# Patient Record
Sex: MALE | Race: WHITE | NOT HISPANIC OR LATINO | Employment: OTHER | URBAN - METROPOLITAN AREA
[De-identification: names, ages, dates, MRNs, and addresses within clinical notes are randomized per-mention and may not be internally consistent; named-entity substitution may affect disease eponyms.]

---

## 2023-09-13 ENCOUNTER — OFFICE VISIT (OUTPATIENT)
Dept: FAMILY MEDICINE CLINIC | Facility: CLINIC | Age: 68
End: 2023-09-13
Payer: MEDICARE

## 2023-09-13 VITALS
WEIGHT: 178 LBS | HEIGHT: 71 IN | HEART RATE: 81 BPM | SYSTOLIC BLOOD PRESSURE: 120 MMHG | DIASTOLIC BLOOD PRESSURE: 68 MMHG | BODY MASS INDEX: 24.92 KG/M2 | OXYGEN SATURATION: 98 %

## 2023-09-13 DIAGNOSIS — Z12.11 SCREENING FOR COLON CANCER: ICD-10-CM

## 2023-09-13 DIAGNOSIS — I10 PRIMARY HYPERTENSION: ICD-10-CM

## 2023-09-13 DIAGNOSIS — Z12.5 PROSTATE CANCER SCREENING: ICD-10-CM

## 2023-09-13 DIAGNOSIS — Z00.00 ROUTINE HEALTH MAINTENANCE: Primary | ICD-10-CM

## 2023-09-13 DIAGNOSIS — R68.82 LOW LIBIDO: ICD-10-CM

## 2023-09-13 DIAGNOSIS — Z13.1 SCREENING FOR DIABETES MELLITUS: ICD-10-CM

## 2023-09-13 DIAGNOSIS — Z13.6 ENCOUNTER FOR LIPID SCREENING FOR CARDIOVASCULAR DISEASE: ICD-10-CM

## 2023-09-13 DIAGNOSIS — Z13.220 ENCOUNTER FOR LIPID SCREENING FOR CARDIOVASCULAR DISEASE: ICD-10-CM

## 2023-09-13 PROBLEM — I87.2 VENOUS INSUFFICIENCY: Status: ACTIVE | Noted: 2019-01-14

## 2023-09-13 PROBLEM — G47.61 PERIODIC LIMB MOVEMENT DISORDER: Status: ACTIVE | Noted: 2018-09-13

## 2023-09-13 RX ORDER — TADALAFIL 5 MG/1
5 TABLET ORAL DAILY PRN
Qty: 90 TABLET | Refills: 2 | Status: SHIPPED | OUTPATIENT
Start: 2023-09-13

## 2023-09-13 RX ORDER — CHLORTHALIDONE 25 MG/1
25 TABLET ORAL DAILY
Qty: 90 TABLET | Refills: 0 | Status: SHIPPED | OUTPATIENT
Start: 2023-09-13

## 2023-09-13 RX ORDER — TADALAFIL 5 MG/1
5 TABLET ORAL DAILY PRN
COMMUNITY
End: 2023-09-13 | Stop reason: SDUPTHER

## 2023-09-13 RX ORDER — EMTRICITABINE AND TENOFOVIR ALAFENAMIDE 200; 25 MG/1; MG/1
TABLET ORAL DAILY
COMMUNITY
End: 2023-09-13

## 2023-09-13 RX ORDER — CHLORTHALIDONE 25 MG/1
25 TABLET ORAL
COMMUNITY
Start: 2023-07-24 | End: 2023-09-13 | Stop reason: SDUPTHER

## 2023-09-13 NOTE — PATIENT INSTRUCTIONS
Advance Directive    Advance directives are legal documents that allow you to make decisions about your health care and medical treatment in case you become unable to communicate for yourself. Advance directives let your wishes be known to family, friends, and health care providers.  Discussing and writing advance directives should happen over time rather than all at once. Advance directives can be changed and updated at any time. There are different types of advance directives, such as:  Medical power of .  Living will.  Do not resuscitate (DNR) order or do not attempt resuscitation (DNAR) order.  Health care proxy and medical power of   A health care proxy is also called a health care agent. This person is appointed to make medical decisions for you when you are unable to make decisions for yourself. Generally, people ask a trusted friend or family member to act as their proxy and represent their preferences. Make sure you have an agreement with your trusted person to act as your proxy. A proxy may have to make a medical decision on your behalf if your wishes are not known.  A medical power of , also called a durable power of  for health care, is a legal document that names your health care proxy. Depending on the laws in your state, the document may need to be:  Signed.  Notarized.  Dated.  Copied.  Witnessed.  Incorporated into your medical record.  You may also want to appoint a trusted person to manage your money in the event you are unable to do so. This is called a durable power of  for finances. It is a separate legal document from the durable power of  for health care. You may choose your health care proxy or someone different to act as your agent in money matters.  If you do not appoint a proxy, or there is a concern that the proxy is not acting in your best interest, a court may appoint a guardian to act on your behalf.  Living will  A living will is a set  of instructions that state your wishes about medical care when you cannot express them yourself. Health care providers should keep a copy of your living will in your medical record. You may want to give a copy to family members or friends. To alert caregivers in case of an emergency, you can place a card in your wallet to let them know that you have a living will and where they can find it. A living will is used if you become:  Terminally ill.  Disabled.  Unable to communicate or make decisions.  The following decisions should be included in your living will:  To use or not to use life support equipment, such as dialysis machines and breathing machines (ventilators).  Whether you want a DNR or DNAR order. This tells health care providers not to use cardiopulmonary resuscitation (CPR) if breathing or heartbeat stops.  To use or not to use tube feeding.  To be given or not to be given food and fluids.  Whether you want comfort (palliative) care when the goal becomes comfort rather than a cure.  Whether you want to donate your organs and tissues.  A living will does not give instructions for distributing your money and property if you should pass away.  DNR or DNAR  A DNR or DNAR order is a request not to have CPR in the event that your heart stops beating or you stop breathing. If a DNR or DNAR order has not been made and shared, a health care provider will try to help any patient whose heart has stopped or who has stopped breathing. If you plan to have surgery, talk with your health care provider about how your DNR or DNAR order will be followed if problems occur.  What if I do not have an advance directive?  Some states assign family decision makers to act on your behalf if you do not have an advance directive. Each state has its own laws about advance directives. You may want to check with your health care provider, , or state representative about the laws in your state.  Summary  Advance directives are  legal documents that allow you to make decisions about your health care and medical treatment in case you become unable to communicate for yourself.  The process of discussing and writing advance directives should happen over time. You can change and update advance directives at any time.  Advance directives may include a medical power of , a living will, and a DNR or DNAR order.  This information is not intended to replace advice given to you by your health care provider. Make sure you discuss any questions you have with your health care provider.  Document Revised: 09/21/2021 Document Reviewed: 09/21/2021  Elsevier Patient Education © 2023 [employer]

## 2023-09-13 NOTE — PROGRESS NOTES
The ABCs of the Annual Wellness Visit  Subsequent Medicare Wellness Visit    Subjective      Otoniel Soni is a 68 y.o. male who presents for a Subsequent Medicare Wellness Visit.    Here today to establish care and for preventive health maintenance. Doing well overall. Would like to get some routine blood work done. Has a family history of prostate cancer in his father. Would like to get a PSA checked. Has noticed some slightly decreased libido over the years. Had a testosterone level that was checked that was within normal limits however his free testosterone was very slightly low.    Told that he had high blood pressure in the past and was started on some chlorthalidone however pressures have been somewhat soft. Wondering if this is something that he needs to continue. Also takes tadalafil for help with erectile function. Needs refills of both.    Due for colonoscopy. Last one was about 3 years ago and had some polyps found.    The following portions of the patient's history were reviewed and   updated as appropriate: allergies, current medications, past family history, past medical history, past social history, past surgical history, and problem list.    Compared to one year ago, the patient feels his physical   health is the same.    Compared to one year ago, the patient feels his mental   health is the same.    Recent Hospitalizations:  He was not admitted to the hospital during the last year.       Current Medical Providers:  Patient Care Team:  Ulysses Damian MD as PCP - General (Family Medicine)    Outpatient Medications Prior to Visit   Medication Sig Dispense Refill    chlorthalidone (HYGROTON) 25 MG tablet 1 tablet.      Emtricitabine-Tenofovir AF (Descovy) 200-25 MG per tablet Take  by mouth Daily.      tadalafil (CIALIS) 5 MG tablet Take 1 tablet by mouth Daily As Needed for Erectile Dysfunction.       No facility-administered medications prior to visit.       No opioid medication identified  "on active medication list. I have reviewed chart for other potential  high risk medication/s and harmful drug interactions in the elderly.        Aspirin is not on active medication list.  Aspirin use is not indicated based on review of current medical condition/s. Risk of harm outweighs potential benefits.  .    Patient Active Problem List   Diagnosis    Periodic limb movement disorder    Hypertension    Venous insufficiency     Advance Care Planning   Advance Care Planning     Advance Directive is not on file.  ACP discussion was held with the patient during this visit. Patient does not have an advance directive, information provided.     Objective    Vitals:    23 1456   BP: 120/68   Pulse: 81   SpO2: 98%   Weight: 80.7 kg (178 lb)   Height: 180.3 cm (71\")     Estimated body mass index is 24.83 kg/m² as calculated from the following:    Height as of this encounter: 180.3 cm (71\").    Weight as of this encounter: 80.7 kg (178 lb).    BMI is within normal parameters. No other follow-up for BMI required.      Does the patient have evidence of cognitive impairment?   No    Lab Results   Component Value Date    CHLPL 194 2023    TRIG 110 2023    HDL 80 2023    LDL 95 2023    VLDL 19 2023          HEALTH RISK ASSESSMENT    Smoking Status:  Social History     Tobacco Use   Smoking Status Never    Passive exposure: Never   Smokeless Tobacco Never     Alcohol Consumption:  Social History     Substance and Sexual Activity   Alcohol Use Yes    Alcohol/week: 6.0 standard drinks    Types: 2 Glasses of wine, 2 Cans of beer, 2 Shots of liquor per week     Fall Risk Screen:    MARTHA Fall Risk Assessment was completed, and patient is at LOW risk for falls.Assessment completed on:2023    Depression Screenin/13/2023     2:58 PM   PHQ-2/PHQ-9 Depression Screening   Little Interest or Pleasure in Doing Things 0-->not at all   Feeling Down, Depressed or Hopeless 0-->not at all "   PHQ-9: Brief Depression Severity Measure Score 0       Health Habits and Functional and Cognitive Screenin/14/2023     8:10 AM   Functional & Cognitive Status   Do you have difficulty preparing food and eating? No   Do you have difficulty bathing yourself, getting dressed or grooming yourself? No   Do you have difficulty using the toilet? No   Do you have difficulty moving around from place to place? No   Do you have trouble with steps or getting out of a bed or a chair? No   Current Diet Well Balanced Diet   Dental Exam Up to date   Eye Exam Up to date   Exercise (times per week) 3 times per week   Current Exercises Include Walking   Do you need help using the phone?  No   Are you deaf or do you have serious difficulty hearing?  No   Do you need help to go to places out of walking distance? No   Do you need help shopping? No   Do you need help preparing meals?  No   Do you need help with housework?  No   Do you need help with laundry? No   Do you need help taking your medications? No   Do you need help managing money? No   Do you ever drive or ride in a car without wearing a seat belt? No   Have you felt unusual stress, anger or loneliness in the last month? No   Who do you live with? Spouse   If you need help, do you have trouble finding someone available to you? No   Do you have difficulty concentrating, remembering or making decisions? No       Age-appropriate Screening Schedule:  Refer to the list below for future screening recommendations based on patient's age, sex and/or medical conditions. Orders for these recommended tests are listed in the plan section. The patient has been provided with a written plan.    Health Maintenance   Topic Date Due    COLORECTAL CANCER SCREENING  Never done    TDAP/TD VACCINES (1 - Tdap) Never done    ZOSTER VACCINE (1 of 2) Never done    Hepatitis B (2 of 3 - Hep B Twinrix risk 3-dose series) 2017    COVID-19 Vaccine (4 - Moderna series) 11/10/2021    INFLUENZA  VACCINE  10/01/2023    ANNUAL WELLNESS VISIT  09/13/2024    HEPATITIS C SCREENING  Completed    Pneumococcal Vaccine 65+  Completed                  CMS Preventative Services Quick Reference  Risk Factors Identified During Encounter:    Immunizations Discussed/Encouraged: Tdap, Influenza, Shingrix, and COVID19    The above risks/problems have been discussed with the patient.  Pertinent information has been shared with the patient in the After Visit Summary.    Diagnoses and all orders for this visit:    1. Routine health maintenance (Primary)    2. Low libido  -     tadalafil (CIALIS) 5 MG tablet; Take 1 tablet by mouth Daily As Needed for Erectile Dysfunction.  Dispense: 90 tablet; Refill: 2    3. Primary hypertension  -     chlorthalidone (HYGROTON) 25 MG tablet; Take 1 tablet by mouth Daily.  Dispense: 90 tablet; Refill: 0    4. Screening for diabetes mellitus  -     Comprehensive Metabolic Panel    5. Encounter for lipid screening for cardiovascular disease  -     Lipid Panel    6. Prostate cancer screening  -     PSA Screen    7. Screening for colon cancer  -     Ambulatory Referral For Screening Colonoscopy    Orders as above. I will contact him with lab results as available. Continue regimen as prescribed. Referral for colonoscopy as discussed.    Encouraged to continue working on healthy lifestyle habits. Recommended follow-up as below. Encouraged communication via [employer] in the meantime.    Follow Up:   Next Medicare Wellness visit to be scheduled in 1 year.      An After Visit Summary and PPPS were made available to the patient.    Prevention counseling performed as below: Mindfulness for stress management.

## 2023-09-14 LAB
ALBUMIN SERPL-MCNC: 4.7 G/DL (ref 3.9–4.9)
ALBUMIN/GLOB SERPL: 1.5 {RATIO} (ref 1.2–2.2)
ALP SERPL-CCNC: 83 IU/L (ref 44–121)
ALT SERPL-CCNC: 26 IU/L (ref 0–44)
AST SERPL-CCNC: 30 IU/L (ref 0–40)
BILIRUB SERPL-MCNC: 0.3 MG/DL (ref 0–1.2)
BUN SERPL-MCNC: 16 MG/DL (ref 8–27)
BUN/CREAT SERPL: 15 (ref 10–24)
CALCIUM SERPL-MCNC: 10 MG/DL (ref 8.6–10.2)
CHLORIDE SERPL-SCNC: 98 MMOL/L (ref 96–106)
CHOLEST SERPL-MCNC: 194 MG/DL (ref 100–199)
CO2 SERPL-SCNC: 27 MMOL/L (ref 20–29)
CREAT SERPL-MCNC: 1.07 MG/DL (ref 0.76–1.27)
EGFRCR SERPLBLD CKD-EPI 2021: 75 ML/MIN/1.73
GLOBULIN SER CALC-MCNC: 3.1 G/DL (ref 1.5–4.5)
GLUCOSE SERPL-MCNC: 101 MG/DL (ref 70–99)
HDLC SERPL-MCNC: 80 MG/DL
LDLC SERPL CALC-MCNC: 95 MG/DL (ref 0–99)
POTASSIUM SERPL-SCNC: 3.7 MMOL/L (ref 3.5–5.2)
PROT SERPL-MCNC: 7.8 G/DL (ref 6–8.5)
PSA SERPL-MCNC: 3.3 NG/ML (ref 0–4)
SODIUM SERPL-SCNC: 140 MMOL/L (ref 134–144)
TRIGL SERPL-MCNC: 110 MG/DL (ref 0–149)
VLDLC SERPL CALC-MCNC: 19 MG/DL (ref 5–40)

## 2023-09-17 DIAGNOSIS — I10 PRIMARY HYPERTENSION: ICD-10-CM

## 2023-09-18 RX ORDER — CHLORTHALIDONE 25 MG/1
25 TABLET ORAL DAILY
Qty: 90 TABLET | Refills: 0 | OUTPATIENT
Start: 2023-09-18

## 2023-09-21 DIAGNOSIS — R68.82 LOW LIBIDO: ICD-10-CM

## 2023-09-21 RX ORDER — TADALAFIL 5 MG/1
5 TABLET ORAL DAILY PRN
Qty: 90 TABLET | Refills: 2 | Status: SHIPPED | OUTPATIENT
Start: 2023-09-21

## 2023-10-06 ENCOUNTER — TELEPHONE (OUTPATIENT)
Dept: GASTROENTEROLOGY | Facility: CLINIC | Age: 68
End: 2023-10-06
Payer: MEDICARE

## 2023-10-06 DIAGNOSIS — Z86.010 PERSONAL HISTORY OF COLONIC POLYPS: Primary | ICD-10-CM

## 2023-10-06 NOTE — TELEPHONE ENCOUNTER
FAST TRACK - REFERRAL  LAST COLONOSCOPY 04/17/2017 BY DR. TAYLOR Zepeda  PERSONAL HISTORY (patient marked)  FAMILY HISTORY POLYPS, BROTHER AGE 60'S?  SCHEDULED AT [facility].

## 2023-10-18 PROBLEM — Z86.0100 PERSONAL HISTORY OF COLONIC POLYPS: Status: ACTIVE | Noted: 2023-10-06

## 2023-10-18 PROBLEM — Z86.010 PERSONAL HISTORY OF COLONIC POLYPS: Status: ACTIVE | Noted: 2023-10-06

## 2024-01-02 DIAGNOSIS — R68.82 LOW LIBIDO: ICD-10-CM

## 2024-01-02 RX ORDER — TADALAFIL 5 MG/1
5 TABLET ORAL DAILY PRN
Qty: 90 TABLET | Refills: 2 | Status: SHIPPED | OUTPATIENT
Start: 2024-01-02 | End: 2024-01-03 | Stop reason: SDUPTHER

## 2024-01-03 DIAGNOSIS — R68.82 LOW LIBIDO: ICD-10-CM

## 2024-01-03 RX ORDER — TADALAFIL 5 MG/1
5 TABLET ORAL DAILY PRN
Qty: 90 TABLET | Refills: 2 | Status: SHIPPED | OUTPATIENT
Start: 2024-01-03

## 2024-02-13 ENCOUNTER — ANESTHESIA (OUTPATIENT)
Dept: SURGERY | Facility: SURGERY CENTER | Age: 69
End: 2024-02-13
Payer: MEDICARE

## 2024-02-13 ENCOUNTER — ANESTHESIA EVENT (OUTPATIENT)
Dept: SURGERY | Facility: SURGERY CENTER | Age: 69
End: 2024-02-13
Payer: MEDICARE

## 2024-02-13 ENCOUNTER — HOSPITAL ENCOUNTER (OUTPATIENT)
Facility: SURGERY CENTER | Age: 69
Setting detail: HOSPITAL OUTPATIENT SURGERY
Discharge: HOME | End: 2024-02-13
Attending: INTERNAL MEDICINE | Admitting: INTERNAL MEDICINE
Payer: MEDICARE

## 2024-02-13 VITALS
HEIGHT: 71 IN | SYSTOLIC BLOOD PRESSURE: 121 MMHG | WEIGHT: 183 LBS | DIASTOLIC BLOOD PRESSURE: 87 MMHG | OXYGEN SATURATION: 98 % | HEART RATE: 74 BPM | TEMPERATURE: 98.2 F | RESPIRATION RATE: 16 BRPM | BODY MASS INDEX: 25.62 KG/M2

## 2024-02-13 DIAGNOSIS — Z86.010 PERSONAL HISTORY OF COLONIC POLYPS: ICD-10-CM

## 2024-02-13 PROCEDURE — 25010000002 LIDOCAINE 1 % SOLUTION: Performed by: STUDENT IN AN ORGANIZED HEALTH CARE EDUCATION/TRAINING PROGRAM

## 2024-02-13 PROCEDURE — 45385 COLONOSCOPY W/LESION REMOVAL: CPT | Performed by: INTERNAL MEDICINE

## 2024-02-13 PROCEDURE — 45380 COLONOSCOPY AND BIOPSY: CPT | Performed by: INTERNAL MEDICINE

## 2024-02-13 PROCEDURE — 25010000002 PROPOFOL 1000 MG/100ML EMULSION: Performed by: STUDENT IN AN ORGANIZED HEALTH CARE EDUCATION/TRAINING PROGRAM

## 2024-02-13 PROCEDURE — 88305 TISSUE EXAM BY PATHOLOGIST: CPT | Performed by: INTERNAL MEDICINE

## 2024-02-13 PROCEDURE — 25810000003 LACTATED RINGERS PER 1000 ML: Performed by: INTERNAL MEDICINE

## 2024-02-13 PROCEDURE — 25010000002 PROPOFOL 10 MG/ML EMULSION: Performed by: STUDENT IN AN ORGANIZED HEALTH CARE EDUCATION/TRAINING PROGRAM

## 2024-02-13 PROCEDURE — 25810000003 LACTATED RINGERS PER 1000 ML: Performed by: STUDENT IN AN ORGANIZED HEALTH CARE EDUCATION/TRAINING PROGRAM

## 2024-02-13 RX ORDER — PROPOFOL 10 MG/ML
INJECTION, EMULSION INTRAVENOUS AS NEEDED
Status: DISCONTINUED | OUTPATIENT
Start: 2024-02-13 | End: 2024-02-13 | Stop reason: SURG

## 2024-02-13 RX ORDER — SODIUM CHLORIDE, SODIUM LACTATE, POTASSIUM CHLORIDE, CALCIUM CHLORIDE 600; 310; 30; 20 MG/100ML; MG/100ML; MG/100ML; MG/100ML
1000 INJECTION, SOLUTION INTRAVENOUS CONTINUOUS
Status: DISCONTINUED | OUTPATIENT
Start: 2024-02-13 | End: 2024-02-13 | Stop reason: HOSPADM

## 2024-02-13 RX ORDER — LIDOCAINE HYDROCHLORIDE 10 MG/ML
0.5 INJECTION, SOLUTION INFILTRATION; PERINEURAL ONCE AS NEEDED
Status: DISCONTINUED | OUTPATIENT
Start: 2024-02-13 | End: 2024-02-13 | Stop reason: HOSPADM

## 2024-02-13 RX ORDER — MAGNESIUM HYDROXIDE 1200 MG/15ML
LIQUID ORAL AS NEEDED
Status: DISCONTINUED | OUTPATIENT
Start: 2024-02-13 | End: 2024-02-13 | Stop reason: HOSPADM

## 2024-02-13 RX ORDER — SODIUM CHLORIDE 0.9 % (FLUSH) 0.9 %
10 SYRINGE (ML) INJECTION AS NEEDED
Status: DISCONTINUED | OUTPATIENT
Start: 2024-02-13 | End: 2024-02-13 | Stop reason: HOSPADM

## 2024-02-13 RX ORDER — LIDOCAINE HYDROCHLORIDE 10 MG/ML
INJECTION, SOLUTION INFILTRATION; PERINEURAL AS NEEDED
Status: DISCONTINUED | OUTPATIENT
Start: 2024-02-13 | End: 2024-02-13 | Stop reason: SURG

## 2024-02-13 RX ORDER — SODIUM CHLORIDE, SODIUM LACTATE, POTASSIUM CHLORIDE, CALCIUM CHLORIDE 600; 310; 30; 20 MG/100ML; MG/100ML; MG/100ML; MG/100ML
INJECTION, SOLUTION INTRAVENOUS CONTINUOUS PRN
Status: DISCONTINUED | OUTPATIENT
Start: 2024-02-13 | End: 2024-02-13 | Stop reason: SURG

## 2024-02-13 RX ADMIN — LIDOCAINE HYDROCHLORIDE 30 MG: 10 INJECTION, SOLUTION INFILTRATION; PERINEURAL at 12:39

## 2024-02-13 RX ADMIN — PROPOFOL 160 MCG/KG/MIN: 10 INJECTION, EMULSION INTRAVENOUS at 12:39

## 2024-02-13 RX ADMIN — SODIUM CHLORIDE, POTASSIUM CHLORIDE, SODIUM LACTATE AND CALCIUM CHLORIDE 1000 ML: 600; 310; 30; 20 INJECTION, SOLUTION INTRAVENOUS at 12:03

## 2024-02-13 RX ADMIN — PROPOFOL 100 MG: 10 INJECTION, EMULSION INTRAVENOUS at 12:39

## 2024-02-13 RX ADMIN — SODIUM CHLORIDE, SODIUM LACTATE, POTASSIUM CHLORIDE, AND CALCIUM CHLORIDE: .6; .31; .03; .02 INJECTION, SOLUTION INTRAVENOUS at 12:39

## 2024-02-14 LAB
LAB AP CASE REPORT: NORMAL
LAB AP CLINICAL INFORMATION: NORMAL
PATH REPORT.FINAL DX SPEC: NORMAL
PATH REPORT.GROSS SPEC: NORMAL

## 2024-05-23 NOTE — PROGRESS NOTES
"Chief Complaint  Establish Care    Subjective    History of Present Illness {CC  Problem List  Visit  Diagnosis   Encounters  Notes  Medications  Labs  Result Review Imaging  Media :23}     Otoniel Soni presents to [facility] for Establish Care.  History of Present Illness         Objective     Vital Signs:   /68   Pulse 81   Ht 180.3 cm (71\")   Wt 80.7 kg (178 lb)   SpO2 98%   BMI 24.83 kg/m²   Physical Exam     Result Review  Data Reviewed:{ Labs  Result Review  Imaging  Med Tab  Media :23}   {The following data was reviewed by (Optional):36854}  {AMBULATORY LABS (Optional):45431}  {Data reviewed (Optional):99336}            Assessment and Plan {CC Problem List  Visit Diagnosis  ROS  Review (Popup)  Health Maintenance  Quality  BestPractice  Medications  SmartSets  SnapShot Encounters  Media :23}   There are no diagnoses linked to this encounter.    BMI is within normal parameters. No other follow-up for BMI required.      Patient was given instructions and counseling regarding his condition or for health maintenance advice. Please see specific information pulled into the AVS (placed there by myself) if appropriate.    No follow-ups on file.      HIMANSHU Damian MD      " n/a

## 2024-08-23 DIAGNOSIS — I10 PRIMARY HYPERTENSION: ICD-10-CM

## 2024-08-23 DIAGNOSIS — R68.82 LOW LIBIDO: ICD-10-CM

## 2024-08-23 RX ORDER — CHLORTHALIDONE 25 MG/1
25 TABLET ORAL DAILY
Qty: 90 TABLET | Refills: 3 | Status: SHIPPED | OUTPATIENT
Start: 2024-08-23

## 2024-08-23 RX ORDER — TADALAFIL 5 MG/1
5 TABLET ORAL DAILY PRN
Qty: 90 TABLET | Refills: 3 | Status: SHIPPED | OUTPATIENT
Start: 2024-08-23

## 2024-12-26 ENCOUNTER — OFFICE VISIT (OUTPATIENT)
Dept: FAMILY MEDICINE CLINIC | Facility: CLINIC | Age: 69
End: 2024-12-26
Payer: MEDICARE

## 2024-12-26 VITALS
HEIGHT: 71 IN | WEIGHT: 183.3 LBS | BODY MASS INDEX: 25.66 KG/M2 | OXYGEN SATURATION: 98 % | SYSTOLIC BLOOD PRESSURE: 160 MMHG | DIASTOLIC BLOOD PRESSURE: 96 MMHG | HEART RATE: 58 BPM | RESPIRATION RATE: 14 BRPM

## 2024-12-26 DIAGNOSIS — Z13.220 ENCOUNTER FOR LIPID SCREENING FOR CARDIOVASCULAR DISEASE: ICD-10-CM

## 2024-12-26 DIAGNOSIS — Z23 NEED FOR VACCINATION: ICD-10-CM

## 2024-12-26 DIAGNOSIS — Z13.6 ENCOUNTER FOR LIPID SCREENING FOR CARDIOVASCULAR DISEASE: ICD-10-CM

## 2024-12-26 DIAGNOSIS — Z12.5 PROSTATE CANCER SCREENING: ICD-10-CM

## 2024-12-26 DIAGNOSIS — I10 PRIMARY HYPERTENSION: ICD-10-CM

## 2024-12-26 DIAGNOSIS — B37.42 CANDIDAL BALANITIS: ICD-10-CM

## 2024-12-26 DIAGNOSIS — Z00.00 ROUTINE HEALTH MAINTENANCE: Primary | ICD-10-CM

## 2024-12-26 DIAGNOSIS — E66.3 OVERWEIGHT WITH BODY MASS INDEX (BMI) OF 25 TO 25.9 IN ADULT: ICD-10-CM

## 2024-12-26 DIAGNOSIS — Z13.1 SCREENING FOR DIABETES MELLITUS: ICD-10-CM

## 2024-12-26 PROCEDURE — 1159F MED LIST DOCD IN RCRD: CPT | Performed by: FAMILY MEDICINE

## 2024-12-26 PROCEDURE — 1160F RVW MEDS BY RX/DR IN RCRD: CPT | Performed by: FAMILY MEDICINE

## 2024-12-26 PROCEDURE — 90480 ADMN SARSCOV2 VAC 1/ONLY CMP: CPT | Performed by: FAMILY MEDICINE

## 2024-12-26 PROCEDURE — 1126F AMNT PAIN NOTED NONE PRSNT: CPT | Performed by: FAMILY MEDICINE

## 2024-12-26 PROCEDURE — G0439 PPPS, SUBSEQ VISIT: HCPCS | Performed by: FAMILY MEDICINE

## 2024-12-26 PROCEDURE — 91320 SARSCV2 VAC 30MCG TRS-SUC IM: CPT | Performed by: FAMILY MEDICINE

## 2024-12-26 PROCEDURE — 99213 OFFICE O/P EST LOW 20 MIN: CPT | Performed by: FAMILY MEDICINE

## 2024-12-26 PROCEDURE — 3080F DIAST BP >= 90 MM HG: CPT | Performed by: FAMILY MEDICINE

## 2024-12-26 PROCEDURE — 3077F SYST BP >= 140 MM HG: CPT | Performed by: FAMILY MEDICINE

## 2024-12-26 RX ORDER — CHLORTHALIDONE 25 MG/1
25 TABLET ORAL DAILY
Qty: 90 TABLET | Refills: 3 | Status: SHIPPED | OUTPATIENT
Start: 2024-12-26

## 2024-12-26 RX ORDER — CLOTRIMAZOLE 1 %
1 CREAM (GRAM) TOPICAL 2 TIMES DAILY
Qty: 28 G | Refills: 1 | Status: SHIPPED | OUTPATIENT
Start: 2024-12-26 | End: 2024-12-28 | Stop reason: SDUPTHER

## 2024-12-26 NOTE — PROGRESS NOTES
Subjective   The ABCs of the Annual Wellness Visit  Medicare Wellness Visit    Otoniel Soni is a 69 y.o. patient who presents for a Medicare Wellness Visit.    Here today as above. Doing fairly well overall. Due for some routine blood work. Would also like to get a COVID-vaccine today. Due for shingles, hep B, and Tdap, will hold off on those for now. Otherwise caught up on preventive health recommendations.    The following portions of the patient's history were reviewed and   updated as appropriate: allergies, current medications, past family history, past medical history, past social history, past surgical history, and problem list.    Compared to one year ago, the patient's physical   health is the same.  Compared to one year ago, the patient's mental   health is the same.    Recent Hospitalizations:  He was not admitted to the hospital during the last year.     Current Medical Providers:  Patient Care Team:  Ulysses Damian MD as PCP - General (Family Medicine)    Outpatient Medications Prior to Visit   Medication Sig Dispense Refill    tadalafil (CIALIS) 5 MG tablet Take 1 tablet by mouth Daily As Needed for Erectile Dysfunction. 90 tablet 3    chlorthalidone (HYGROTON) 25 MG tablet Take 1 tablet by mouth Daily. (Patient taking differently: Take 1 tablet by mouth Daily. PATIENT REPORTS TAKES ONE AT ONSET OF HEADACHE FOR ONE WEEK.) 90 tablet 3     No facility-administered medications prior to visit.     No opioid medication identified on active medication list. I have reviewed chart for other potential  high risk medication/s and harmful drug interactions in the elderly.    Aspirin is not on active medication list.  Aspirin use is not indicated based on review of current medical condition/s. Risk of harm outweighs potential benefits.  .    Patient Active Problem List   Diagnosis    Periodic limb movement disorder    Primary hypertension    Venous insufficiency    Personal history of colonic polyps  "    Advance Care Planning Advance Directive is not on file.  ACP discussion was held with the patient during this visit. Patient has an advance directive (not in EMR), copy requested.      Objective   Vitals:    24 1245   BP: 160/96   Pulse: 58   Resp: 14   SpO2: 98%   Weight: 83.1 kg (183 lb 4.8 oz)   Height: 180.3 cm (71\")   PainSc: 0-No pain     Estimated body mass index is 25.57 kg/m² as calculated from the following:    Height as of this encounter: 180.3 cm (71\").    Weight as of this encounter: 83.1 kg (183 lb 4.8 oz).    BMI is >= 25 and <30. (Overweight) The following options were offered after discussion;: exercise counseling/recommendations and nutrition counseling/recommendations      Does the patient have evidence of cognitive impairment? No                                                                                     Health  Risk Assessment    Smoking Status:  Social History     Tobacco Use   Smoking Status Never    Passive exposure: Never   Smokeless Tobacco Never     Alcohol Consumption:  Social History     Substance and Sexual Activity   Alcohol Use Not Currently    Alcohol/week: 6.0 standard drinks of alcohol    Types: 2 Glasses of wine, 2 Cans of beer, 2 Shots of liquor per week       Fall Risk Screen  STEADI Fall Risk Assessment was completed, and patient is at LOW risk for falls.Assessment completed on:2024    Depression Screening   Little interest or pleasure in doing things? Not at all   Feeling down, depressed, or hopeless? Not at all   PHQ-2 Total Score 0      Health Habits and Functional and Cognitive Screenin/19/2024     1:10 PM   Functional & Cognitive Status   Do you have difficulty preparing food and eating? No    Do you have difficulty bathing yourself, getting dressed or grooming yourself? No    Do you have difficulty using the toilet? No    Do you have difficulty moving around from place to place? No    Do you have trouble with steps or getting out of a bed " or a chair? No    Current Diet Well Balanced Diet    Dental Exam Not up to date    Eye Exam Not up to date    Exercise (times per week) 0 times per week    Current Exercises Include No Regular Exercise    Do you need help using the phone?  No    Are you deaf or do you have serious difficulty hearing?  No    Do you need help to go to places out of walking distance? No    Do you need help shopping? No    Do you need help preparing meals?  No    Do you need help with housework?  No    Do you need help with laundry? No    Do you need help taking your medications? No    Do you need help managing money? No    Do you ever drive or ride in a car without wearing a seat belt? Yes    Have you felt unusual stress, anger or loneliness in the last month? No    Who do you live with? Spouse    If you need help, do you have trouble finding someone available to you? No    Have you been bothered in the last four weeks by sexual problems? Yes    Do you have difficulty concentrating, remembering or making decisions? No        Patient-reported         Age-appropriate Screening Schedule:  Refer to the list below for future screening recommendations based on patient's age, sex and/or medical conditions. Orders for these recommended tests are listed in the plan section. The patient has been provided with a written plan.    Health Maintenance List  Health Maintenance   Topic Date Due    BMI FOLLOWUP  Never done    TDAP/TD VACCINES (1 - Tdap) Never done    ZOSTER VACCINE (1 of 2) Never done    Hepatitis B (2 of 3 - Hep B Twinrix risk 3-dose series) 03/27/2017    COVID-19 Vaccine (4 - 2024-25 season) 09/01/2024    INFLUENZA VACCINE  03/31/2025 (Originally 7/1/2024)    ANNUAL WELLNESS VISIT  12/26/2025    COLORECTAL CANCER SCREENING  02/13/2034    HEPATITIS C SCREENING  Completed    Pneumococcal Vaccine 65+  Completed                                                                                                                                   "            CMS Preventative Services Quick Reference  Risk Factors Identified During Encounter  Immunizations Discussed/Encouraged: Tdap, Shingrix, and COVID19    The above risks/problems have been discussed with the patient.  Pertinent information has been shared with the patient in the After Visit Summary.  An After Visit Summary and PPPS were made available to the patient.    Follow Up:   Next Medicare Wellness visit to be scheduled in 1 year.     Additional E&M Note during same encounter follows:  Patient has additional, significant, and separately identifiable condition(s)/problem(s) that require work above and beyond the Medicare Wellness Visit     Chief Complaint  Medicare Wellness-subsequent    Subjective    HPI  Otoniel is also being seen today for additional medical problem/s.    Has had some hypopigmented irritated skin around the corona of his penis off and on for many months. Was previously treated with a topical steroid with some slight improvement. However had skin thinning and has since switched to a moisturizing cream. Will have periods of time in which it seems to go away completely but has never fully resolved. No pain, does have some irritation. No problems with urination, or ejaculation. No problem with erections though they are occasionally uncomfortable.    Has also noticed his blood pressure being up of late. Has occasional headaches in the morning that he thinks might be related. Has not been taking his chlorthalidone regularly. Does not have a home pressure cuff that he can use regularly.    Objective   Vital Signs:  /96   Pulse 58   Resp 14   Ht 180.3 cm (71\")   Wt 83.1 kg (183 lb 4.8 oz)   SpO2 98%   BMI 25.57 kg/m²   Physical Exam  Vitals and nursing note reviewed.   Constitutional:       General: He is not in acute distress.     Appearance: Normal appearance. He is not ill-appearing.   Cardiovascular:      Rate and Rhythm: Normal rate and regular rhythm.      Pulses: Normal " pulses.      Heart sounds: Normal heart sounds. No murmur heard.  Pulmonary:      Effort: Pulmonary effort is normal. No respiratory distress.      Breath sounds: Normal breath sounds. No rales.   Genitourinary:     Comments: Smooth erythematous patches around corona of penis, appearance c/w candidal balanitis.  Neurological:      Mental Status: He is alert and oriented to person, place, and time. Mental status is at baseline.   Psychiatric:         Mood and Affect: Mood normal.         Behavior: Behavior normal.          Assessment and Plan   Diagnoses and all orders for this visit:    1. Routine health maintenance (Primary)    2. Candidal balanitis  -     clotrimazole (LOTRIMIN) 1 % cream; Apply 1 Application topically to the appropriate area as directed 2 (Two) Times a Day.  Dispense: 28 g; Refill: 1    3. Primary hypertension  -     chlorthalidone (HYGROTON) 25 MG tablet; Take 1 tablet by mouth Daily.  Dispense: 90 tablet; Refill: 3  -     Comprehensive Metabolic Panel    4. Screening for diabetes mellitus  -     Comprehensive Metabolic Panel    5. Encounter for lipid screening for cardiovascular disease  -     Lipid Panel    6. Prostate cancer screening  -     PSA Screen    7. Overweight with body mass index (BMI) of 25 to 25.9 in adult    8. Need for vaccination  -     COVID-19 (Pfizer) 12yrs+ (COMIRNATY)    Orders as above. Will try some clotrimazole for what appears to be candidal balanitis.    Recommended he take his chlorthalidone daily for better blood pressure control.    I will contact him with lab results as available. Vaccine as requested.    Encouraged to continue working on healthy lifestyle habits. Recommended follow-up as below. Encouraged communication via [employer] in the meantime.      Follow Up   Return if symptoms worsen or fail to improve.  Patient was given instructions and counseling regarding his condition or for health maintenance advice. Please see specific information pulled into the AVS  if appropriate.    Prevention counseling performed as below: Mindfulness for stress management.

## 2024-12-27 ENCOUNTER — PRIOR AUTHORIZATION (OUTPATIENT)
Dept: FAMILY MEDICINE CLINIC | Facility: CLINIC | Age: 69
End: 2024-12-27
Payer: MEDICARE

## 2024-12-27 NOTE — TELEPHONE ENCOUNTER
Received fax from [facility] pharmacy on [address] [place] requesting to change patient's clotrimazole (LOTRIMIN) 1 % cream to preferred alternative ketoconazole cream or initiate PA.    PA initiated for clotrimazole (LOTRIMIN) 1 % cream via [employer]. Awaiting response.

## 2025-01-04 LAB
ALBUMIN SERPL-MCNC: 4.6 G/DL (ref 3.5–5.2)
ALBUMIN/GLOB SERPL: 1.3 G/DL
ALP SERPL-CCNC: 102 U/L (ref 39–117)
ALT SERPL-CCNC: 23 U/L (ref 1–41)
AST SERPL-CCNC: 26 U/L (ref 1–40)
BILIRUB SERPL-MCNC: 0.6 MG/DL (ref 0–1.2)
BUN SERPL-MCNC: 14 MG/DL (ref 8–23)
BUN/CREAT SERPL: 13.2 (ref 7–25)
CALCIUM SERPL-MCNC: 10 MG/DL (ref 8.6–10.5)
CHLORIDE SERPL-SCNC: 99 MMOL/L (ref 98–107)
CHOLEST SERPL-MCNC: 187 MG/DL (ref 0–200)
CO2 SERPL-SCNC: 27.5 MMOL/L (ref 22–29)
CREAT SERPL-MCNC: 1.06 MG/DL (ref 0.76–1.27)
EGFRCR SERPLBLD CKD-EPI 2021: 75 ML/MIN/1.73
GLOBULIN SER CALC-MCNC: 3.5 GM/DL
GLUCOSE SERPL-MCNC: 103 MG/DL (ref 65–99)
HDLC SERPL-MCNC: 73 MG/DL (ref 40–60)
LDLC SERPL CALC-MCNC: 102 MG/DL (ref 0–100)
POTASSIUM SERPL-SCNC: 3.7 MMOL/L (ref 3.5–5.2)
PROT SERPL-MCNC: 8.1 G/DL (ref 6–8.5)
PSA SERPL-MCNC: 2.62 NG/ML (ref 0–4)
SODIUM SERPL-SCNC: 139 MMOL/L (ref 136–145)
TRIGL SERPL-MCNC: 61 MG/DL (ref 0–150)
VLDLC SERPL CALC-MCNC: 12 MG/DL (ref 5–40)

## (undated) DEVICE — FRCP BX RADJAW4 NDL 2.8 240CM LG OG BX80

## (undated) DEVICE — LINER CANSTR SXN MEDIVAC FLX/ADV 1500ML

## (undated) DEVICE — ADAPT CLN SCPE ENDO PORPOISE BX/50 DISP

## (undated) DEVICE — VIAL FORMLN CAP 10PCT 20ML

## (undated) DEVICE — KT ORCA ORCAPOD DISP STRL

## (undated) DEVICE — TRAP POLYP ETRAP 2PK

## (undated) DEVICE — GOWN ISOL W/THUMB UNIV BLU BX/15

## (undated) DEVICE — SNAR POLYPECTOMY LASSO ROT OVL H/C 2.8X15X230MM

## (undated) DEVICE — Device

## (undated) DEVICE — CANN O2 ETCO2 FITS ALL CONN CO2 SMPL A/ 7IN DISP LF

## (undated) DEVICE — JACKT LAB F/R KNIT CUFF/COLR XLG BLU